# Patient Record
Sex: FEMALE | URBAN - METROPOLITAN AREA
[De-identification: names, ages, dates, MRNs, and addresses within clinical notes are randomized per-mention and may not be internally consistent; named-entity substitution may affect disease eponyms.]

---

## 2022-11-11 ENCOUNTER — WEB ENCOUNTER (OUTPATIENT)
Dept: URBAN - METROPOLITAN AREA CLINIC 72 | Facility: CLINIC | Age: 62
End: 2022-11-11

## 2022-11-14 ENCOUNTER — OFFICE VISIT (OUTPATIENT)
Dept: URBAN - METROPOLITAN AREA CLINIC 72 | Facility: CLINIC | Age: 62
End: 2022-11-14
Payer: OTHER GOVERNMENT

## 2022-11-14 ENCOUNTER — LAB OUTSIDE AN ENCOUNTER (OUTPATIENT)
Dept: URBAN - METROPOLITAN AREA CLINIC 72 | Facility: CLINIC | Age: 62
End: 2022-11-14

## 2022-11-14 VITALS
SYSTOLIC BLOOD PRESSURE: 101 MMHG | RESPIRATION RATE: 18 BRPM | DIASTOLIC BLOOD PRESSURE: 69 MMHG | WEIGHT: 143 LBS | HEART RATE: 57 BPM | TEMPERATURE: 97.5 F

## 2022-11-14 DIAGNOSIS — K59.00 CONSTIPATION, UNSPECIFIED CONSTIPATION TYPE: ICD-10-CM

## 2022-11-14 DIAGNOSIS — D64.9 ANEMIA, UNSPECIFIED TYPE: ICD-10-CM

## 2022-11-14 DIAGNOSIS — K62.5 BRIGHT RED BLOOD PER RECTUM: ICD-10-CM

## 2022-11-14 PROCEDURE — 99204 OFFICE O/P NEW MOD 45 MIN: CPT | Performed by: INTERNAL MEDICINE

## 2022-11-14 RX ORDER — POMALIDOMIDE 3 MG/1
1 CAPSULE CAPSULE ORAL ONCE A DAY
Status: ACTIVE | COMMUNITY

## 2022-11-14 NOTE — HPI-TODAY'S VISIT:
62-year-old female known to the clinic here for screening colonoscopy.    On inteview today she reports that she had a incident three weeks ago where she noticed blood in her stool.  In the bowl about a teaspoon.  This wasnt the first time she noticed it a few months ago as well. Seems to come and go. No rectal pain.  Has BM daily type 1 on the bristol scale.  Hgb runs around 10.5 with her history of multiple myeloma.  No CP, SOB, palpitations, syncope, near-syncope or problems with anesthesia previously.   Last colonoscopy 2018- normal. Never had EGD.

## 2022-12-22 ENCOUNTER — OFFICE VISIT (OUTPATIENT)
Dept: URBAN - METROPOLITAN AREA MEDICAL CENTER 40 | Facility: MEDICAL CENTER | Age: 62
End: 2022-12-22
Payer: OTHER GOVERNMENT

## 2022-12-22 DIAGNOSIS — K64.0 BLEEDING GRADE I HEMORRHOIDS: ICD-10-CM

## 2022-12-22 DIAGNOSIS — K62.5 ANAL BLEEDING: ICD-10-CM

## 2022-12-22 DIAGNOSIS — K57.30 ACQUIRED DIVERTICULOSIS OF COLON: ICD-10-CM

## 2022-12-22 PROCEDURE — 45378 DIAGNOSTIC COLONOSCOPY: CPT | Performed by: INTERNAL MEDICINE

## 2023-01-12 ENCOUNTER — OFFICE VISIT (OUTPATIENT)
Dept: URBAN - METROPOLITAN AREA CLINIC 72 | Facility: CLINIC | Age: 63
End: 2023-01-12
Payer: OTHER GOVERNMENT

## 2023-01-12 VITALS
SYSTOLIC BLOOD PRESSURE: 96 MMHG | HEART RATE: 63 BPM | TEMPERATURE: 97.5 F | HEIGHT: 70 IN | WEIGHT: 142 LBS | BODY MASS INDEX: 20.33 KG/M2 | DIASTOLIC BLOOD PRESSURE: 57 MMHG

## 2023-01-12 DIAGNOSIS — K64.0 GRADE I HEMORRHOIDS: ICD-10-CM

## 2023-01-12 DIAGNOSIS — K57.30 DIVERTICULA OF COLON: ICD-10-CM

## 2023-01-12 DIAGNOSIS — K62.5 BRIGHT RED BLOOD PER RECTUM: ICD-10-CM

## 2023-01-12 DIAGNOSIS — K59.09 CHANGE IN BOWEL MOVEMENTS INTERMITTENT CONSTIPATION. URGENCY IN THE MORNING.: ICD-10-CM

## 2023-01-12 PROBLEM — 271737000: Status: ACTIVE | Noted: 2023-01-12

## 2023-01-12 PROBLEM — 14760008: Status: ACTIVE | Noted: 2022-11-14

## 2023-01-12 PROBLEM — 733657002: Status: ACTIVE | Noted: 2023-01-12

## 2023-01-12 PROBLEM — 721703004: Status: ACTIVE | Noted: 2023-01-12

## 2023-01-12 PROCEDURE — 99214 OFFICE O/P EST MOD 30 MIN: CPT | Performed by: INTERNAL MEDICINE

## 2023-01-12 RX ORDER — HYDROCORTISONE ACETATE 25 MG/1
1 SUPPOSITORY SUPPOSITORY RECTAL TWICE A DAY
Qty: 28 | Refills: 1 | OUTPATIENT
Start: 2023-01-12 | End: 2023-02-09

## 2023-01-12 RX ORDER — POMALIDOMIDE 3 MG/1
1 CAPSULE CAPSULE ORAL ONCE A DAY
Status: ACTIVE | COMMUNITY

## 2023-01-12 NOTE — HPI-TODAY'S VISIT:
62-year-old female known to the clinic here for a colonoscopy follow-up.   On interview today she continues to notice a small amount of bleeding in the bowl.  No rectal pain.  Stools tend to be harder but has a BM daily.  No abdominal pain or upper GI concerns.  Hgb runs around 10.5 with her history of multiple myeloma.  No CP, SOB, palpitations, syncope, near-syncope or problems with anesthesia previously.    No prior EGD.    Colonoscopy 12/22/2022 revealed mild diverticulosis from sigmoid to splenic flexure.  Grade 1 nonbleeding internal hemorrhoids.

## 2023-07-18 ENCOUNTER — OFFICE VISIT (OUTPATIENT)
Dept: URBAN - METROPOLITAN AREA CLINIC 72 | Facility: CLINIC | Age: 63
End: 2023-07-18
Payer: OTHER GOVERNMENT

## 2023-07-18 VITALS
BODY MASS INDEX: 20.1 KG/M2 | HEIGHT: 70 IN | WEIGHT: 140.4 LBS | HEART RATE: 58 BPM | DIASTOLIC BLOOD PRESSURE: 64 MMHG | TEMPERATURE: 96.8 F | SYSTOLIC BLOOD PRESSURE: 102 MMHG

## 2023-07-18 DIAGNOSIS — K57.30 DIVERTICULA OF COLON: ICD-10-CM

## 2023-07-18 DIAGNOSIS — D64.9 ABSOLUTE ANEMIA: ICD-10-CM

## 2023-07-18 DIAGNOSIS — R15.0 INCOMPLETE DEFECATION: ICD-10-CM

## 2023-07-18 DIAGNOSIS — K64.9 ACUTE HEMORRHOID: ICD-10-CM

## 2023-07-18 PROCEDURE — 99213 OFFICE O/P EST LOW 20 MIN: CPT | Performed by: INTERNAL MEDICINE

## 2023-07-18 RX ORDER — POMALIDOMIDE 3 MG/1
1 CAPSULE CAPSULE ORAL ONCE A DAY
Status: ACTIVE | COMMUNITY

## 2023-07-18 NOTE — HPI-TODAY'S VISIT:
Mrs. Beck returns for follow-up, recall she is a pleasant 63-year-old last seen our office on 1/12/2023.  She underwent colonoscopy 12/22/2022 that revealed mild diverticulosis on the left side and grade 1 nonbleeding internal hemorrhoids.  She is struggled with anemia with a hemoglobin around 10.5 which is consistent and persistent given her history of multiple myeloma.  We saw her last she was complaining of occasional small mount of bleeding but no rectal pain, noted firmer stools which seem to exacerbate symptoms.  Fiber and stool softener recommended as it was felt that the bleeding was from her known hemorrhoidal disease and she was given suppositories.  She now returns with a new referral from Dr. Saleem Garcia, a copy of this consultation will be forwarded to the referring physician.  She is having diarrhea with mucus.    Lab work from July 5, 2023 showed hemoglobin of 10.6, platelets 147, sodium 140, potassium 4.4, BUN 21, creatinine 0.79, albumin 3.7, ALT 20, AST 18, alkaline phosphatase 57, bilirubin less than 0.2, IgA is low at 22  She reports that she had been doing well but now is having issues with frequent stools.  She reports that she is seeing a lot of mucus with the stool, she is having multiple bowel movements in the morning but describes them as a [place] stool scale 1.  She actually states that she feels like she is not completely emptying her bowels and is having to go multiple times but there is occasional urgency but no watery diarrhea.  Again she describes the stools as small ball-like stools or firm walls like hard stools.  She has not used any laxatives.  It was recommended that she try fiber but she did not start this.

## 2023-10-16 ENCOUNTER — OFFICE VISIT (OUTPATIENT)
Dept: URBAN - METROPOLITAN AREA CLINIC 72 | Facility: CLINIC | Age: 63
End: 2023-10-16
Payer: OTHER GOVERNMENT

## 2023-10-16 ENCOUNTER — DASHBOARD ENCOUNTERS (OUTPATIENT)
Age: 63
End: 2023-10-16

## 2023-10-16 VITALS
TEMPERATURE: 96.8 F | WEIGHT: 143 LBS | SYSTOLIC BLOOD PRESSURE: 115 MMHG | DIASTOLIC BLOOD PRESSURE: 63 MMHG | BODY MASS INDEX: 20.47 KG/M2 | HEIGHT: 70 IN | HEART RATE: 56 BPM

## 2023-10-16 DIAGNOSIS — R15.0 INCOMPLETE DEFECATION: ICD-10-CM

## 2023-10-16 DIAGNOSIS — K57.30 DIVERTICULA OF COLON: ICD-10-CM

## 2023-10-16 DIAGNOSIS — K21.9 GASTROESOPHAGEAL REFLUX DISEASE WITHOUT ESOPHAGITIS: ICD-10-CM

## 2023-10-16 DIAGNOSIS — D64.89 ANEMIA DUE TO OTHER CAUSE: ICD-10-CM

## 2023-10-16 PROBLEM — 266435005: Status: ACTIVE | Noted: 2023-10-16

## 2023-10-16 PROCEDURE — 99214 OFFICE O/P EST MOD 30 MIN: CPT | Performed by: INTERNAL MEDICINE

## 2023-10-16 RX ORDER — POMALIDOMIDE 3 MG/1
1 CAPSULE CAPSULE ORAL ONCE A DAY
Status: ACTIVE | COMMUNITY

## 2023-10-16 NOTE — HPI-OTHER HISTORIES
Labs: -July 5, 2023 showed hemoglobin of 10.6, platelets 147, sodium 140, potassium 4.4, BUN 21, creatinine 0.79, albumin 3.7, ALT 20, AST 18, alkaline phosphatase 57, bilirubin less than 0.2, IgA is low at 22 -8/31/2023.  CBC: WBC 2.12, RBC 3.3, Hgb 10.3, HCT 32.5. -10/12/2023.  CBC: WBC 1.92, RBC 3.2, Hgb 10.4, HCT 31.7 with normal platelet.  Procedures: -Colonoscopy 12/22/2022 that revealed mild diverticulosis on the left side and grade 1 nonbleeding internal hemorrhoids.

## 2023-10-16 NOTE — HPI-TODAY'S VISIT:
[place] 63-year-old here for a 3 month follow-up appointment.    Last seen 7/18/2023 for incomplete defecation initially thought it was diarrhea but not completely evacuating her bowels.  Fiber supplement recommended and titrate MiraLAX to effect.  She is struggled with anemia with a hemoglobin around 10.5 which is consistent and persistent given her history of multiple myeloma. Follows Dr. Garcia.    She started taking benefiber. It was going well initially and now she is having harder stools every other day. Occasional GERD.  No melena or hematochezia.

## 2024-05-24 ENCOUNTER — CLAIMS CREATED FROM THE CLAIM WINDOW (OUTPATIENT)
Dept: URBAN - METROPOLITAN AREA CLINIC 72 | Facility: CLINIC | Age: 64
End: 2024-05-24

## 2024-05-24 ENCOUNTER — OFFICE VISIT (OUTPATIENT)
Dept: URBAN - METROPOLITAN AREA CLINIC 72 | Facility: CLINIC | Age: 64
End: 2024-05-24
Payer: OTHER GOVERNMENT

## 2024-05-24 VITALS
HEART RATE: 76 BPM | HEIGHT: 70 IN | DIASTOLIC BLOOD PRESSURE: 64 MMHG | SYSTOLIC BLOOD PRESSURE: 91 MMHG | WEIGHT: 140 LBS | BODY MASS INDEX: 20.04 KG/M2 | TEMPERATURE: 97.7 F

## 2024-05-24 DIAGNOSIS — K59.00 CONSTIPATION, UNSPECIFIED CONSTIPATION TYPE: ICD-10-CM

## 2024-05-24 DIAGNOSIS — K21.9 GASTROESOPHAGEAL REFLUX DISEASE WITHOUT ESOPHAGITIS: ICD-10-CM

## 2024-05-24 DIAGNOSIS — K64.0 GRADE I HEMORRHOIDS: ICD-10-CM

## 2024-05-24 DIAGNOSIS — K57.30 DIVERTICULA OF COLON: ICD-10-CM

## 2024-05-24 PROCEDURE — 99214 OFFICE O/P EST MOD 30 MIN: CPT | Performed by: NURSE PRACTITIONER

## 2024-05-24 RX ORDER — BORTEZOMIB 3.5 MG/1
AS DIRECTED INJECTION, POWDER, LYOPHILIZED, FOR SOLUTION INTRAVENOUS; SUBCUTANEOUS
Status: ACTIVE | COMMUNITY

## 2024-05-24 RX ORDER — DENOSUMAB 120 MG/1.7ML
AS DIRECTED INJECTION SUBCUTANEOUS
Status: ACTIVE | COMMUNITY

## 2024-05-24 RX ORDER — POMALIDOMIDE 3 MG/1
1 CAPSULE CAPSULE ORAL ONCE A DAY
Status: ON HOLD | COMMUNITY

## 2024-08-28 ENCOUNTER — OFFICE VISIT (OUTPATIENT)
Dept: URBAN - METROPOLITAN AREA CLINIC 72 | Facility: CLINIC | Age: 64
End: 2024-08-28
Payer: OTHER GOVERNMENT

## 2024-08-28 VITALS
WEIGHT: 141.2 LBS | TEMPERATURE: 97.2 F | BODY MASS INDEX: 20.22 KG/M2 | HEART RATE: 64 BPM | DIASTOLIC BLOOD PRESSURE: 70 MMHG | SYSTOLIC BLOOD PRESSURE: 101 MMHG | HEIGHT: 70 IN

## 2024-08-28 DIAGNOSIS — K59.09 OTHER CONSTIPATION: ICD-10-CM

## 2024-08-28 DIAGNOSIS — R14.0 ABDOMINAL BLOATING: ICD-10-CM

## 2024-08-28 PROBLEM — 116289008: Status: ACTIVE | Noted: 2024-08-28

## 2024-08-28 PROCEDURE — 99214 OFFICE O/P EST MOD 30 MIN: CPT | Performed by: INTERNAL MEDICINE

## 2024-08-28 RX ORDER — DENOSUMAB 120 MG/1.7ML
AS DIRECTED INJECTION SUBCUTANEOUS
Status: ACTIVE | COMMUNITY

## 2024-08-28 RX ORDER — BORTEZOMIB 3.5 MG/1
AS DIRECTED INJECTION, POWDER, LYOPHILIZED, FOR SOLUTION INTRAVENOUS; SUBCUTANEOUS
Status: ACTIVE | COMMUNITY

## 2024-08-28 RX ORDER — POMALIDOMIDE 3 MG/1
1 CAPSULE CAPSULE ORAL ONCE A DAY
Status: ON HOLD | COMMUNITY

## 2024-08-28 NOTE — HPI-TODAY'S VISIT:
Patient is a 64-year-old female last seen in the office on 5/24/2024 by Gina Stewart NP for constipation, hemorrhoids, diverticula, GERD.  Patient was put on MiraLAX and fiber, and told to use stimulants every third day if needed.  She was also provided with samples of Linzess 72 mcg.  She is here today for her 3-month follow-up.  Last colonoscopy performed in December 2022 revealed diverticulosis, grade 1 hemorrhoids.  PMH: Multiple myeloma with chronic anemia.  Patient never tried the Linzess. She has been taking Benefiber since last fall, 2 TBLSPN in the morning. This is working for her.   When she feels backed up she will try adding the miralax.